# Patient Record
Sex: FEMALE | Race: WHITE | Employment: FULL TIME | URBAN - METROPOLITAN AREA
[De-identification: names, ages, dates, MRNs, and addresses within clinical notes are randomized per-mention and may not be internally consistent; named-entity substitution may affect disease eponyms.]

---

## 2017-01-04 ENCOUNTER — OFFICE VISIT (OUTPATIENT)
Dept: PERINATAL CARE | Facility: HOSPITAL | Age: 37
End: 2017-01-04
Attending: PEDIATRICS

## 2017-01-04 PROCEDURE — 93325 DOPPLER ECHO COLOR FLOW MAPG: CPT

## 2017-01-04 PROCEDURE — 76825 ECHO EXAM OF FETAL HEART: CPT

## 2017-01-04 PROCEDURE — 76827 ECHO EXAM OF FETAL HEART: CPT

## 2017-01-04 NOTE — PROGRESS NOTES
CARDIOLOGY CONSULTATION AND FETAL ECHOCARDIOGRAM :     Dear Dr. Anselmo Scheuermann :Todd Scott     Thank you for requesting a follow up fetal echocardiogram and follow up consultation for Latesha West regarding fetal cardiac arrhythmia.  I saw her in the fetal echoc formation was noted. RECOMMENDATIONS :      1. Routine Ob/MFM care  2. Weekly fetal heart rate checks to make sure that there is no sustained tachycardia or bradycardia until the cardiac arrhythmia resolve.   3. Avoid caffeine and caffeinated food and dr

## 2017-01-26 PROCEDURE — 87081 CULTURE SCREEN ONLY: CPT | Performed by: OBSTETRICS & GYNECOLOGY

## 2017-02-18 ENCOUNTER — NURSE ONLY (OUTPATIENT)
Dept: LACTATION | Facility: HOSPITAL | Age: 37
End: 2017-02-18
Attending: OBSTETRICS & GYNECOLOGY
Payer: COMMERCIAL

## 2017-02-18 PROCEDURE — 99213 OFFICE O/P EST LOW 20 MIN: CPT | Performed by: NURSE PRACTITIONER

## 2017-02-18 NOTE — PATIENT INSTRUCTIONS
Christiane Whitney and [place] ( 19)    Naked weight today 7 lbs 5.4 oz    Milk intake today 20 ml on L and 4 ml on R    Breastfeed Shruthi about 10 min each side and then pump right away while he gets supplemented. Save that milk for the next feeding.   We ounces per week    Supplementation:   If not meeting these guidelines for adequate breastfeeding, feed 1-2 oz or more expressed milk or formula with a wide based, slow flow nipple    Paced bottle feeding using a slow flow nipple:   · Hold your baby in an u

## 2017-02-18 NOTE — PROGRESS NOTES
LACTATION NOTE - MOTHER           Problems identified  Problems identified: Knowledge deficit; Nipple pain; Nipple trauma; Engorgement; Unable to acheive sustained latch;Milk supply not WNL  Milk supply not WNL: Reduced (potential)  Problems Identified Other: offered followup visit, will call if needed  Written Education: Engorgement/sore nipple prevention and management; Outpatient lactation clinic handout;Milk supply increase strategies;Breastfeeding suggestions for home;Patient Instructions; Supplementation Gu

## 2017-02-19 ENCOUNTER — TELEPHONE (OUTPATIENT)
Dept: OBGYN UNIT | Facility: HOSPITAL | Age: 37
End: 2017-02-19

## 2017-02-20 ENCOUNTER — TELEPHONE (OUTPATIENT)
Dept: OBGYN UNIT | Facility: HOSPITAL | Age: 37
End: 2017-02-20

## 2017-02-28 ENCOUNTER — NURSE ONLY (OUTPATIENT)
Dept: LACTATION | Facility: HOSPITAL | Age: 37
End: 2017-02-28
Attending: OBSTETRICS & GYNECOLOGY
Payer: COMMERCIAL

## 2017-02-28 PROCEDURE — 99213 OFFICE O/P EST LOW 20 MIN: CPT

## 2017-02-28 NOTE — PROGRESS NOTES
LACTATION NOTE - MOTHER           Problems identified  Problems identified: Knowledge deficit;Milk supply WNL  Problems Identified Other:  (hx of nipple pain/trauma, now healed with nipple shield use for last 9 days.)         Breastfeeding goal  Breastfeed

## 2017-03-24 PROCEDURE — 87660 TRICHOMONAS VAGIN DIR PROBE: CPT | Performed by: OBSTETRICS & GYNECOLOGY

## 2017-03-24 PROCEDURE — 87480 CANDIDA DNA DIR PROBE: CPT | Performed by: OBSTETRICS & GYNECOLOGY

## 2017-03-24 PROCEDURE — 87510 GARDNER VAG DNA DIR PROBE: CPT | Performed by: OBSTETRICS & GYNECOLOGY

## (undated) NOTE — MR AVS SNAPSHOT
After Visit Summary   2017    Phan Arshad    MRN: [id number]           Visit Information        Provider Department Dept Phone    2017 12:00 PM [id number]   Outpt [phone]      Your Vitals Were     LMP

## (undated) NOTE — MR AVS SNAPSHOT
[facility]  [address] [facility] [address]  [phone]               Thank you for choosing us for your health care visit with 1 Crispin Contreras.   We are glad to serve you and happy to provide you with this summary Summaries. If you've been to the Emergency Department or your doctor's office, you can view your past visit information in [employer] by going to Visits < Visit Summaries. [employer] questions? Call [phone] for help.   [employer] is NOT to be used for urge

## (undated) NOTE — MR AVS SNAPSHOT
[facility]  [address] [facility] [address]  [phone]               Thank you for choosing us for your health care visit with 1 Crispin Contreras.   We are glad to serve you and happy to provide you with this summary · Stroke nipple lightly down center of lips  · Wait for wide mouth with tongue cupped at bottom of mouth. · Chin should be deep into breast, with some room between nose and the breast.   · If needed, gently draw chin down lower to deepen latch.     Is baby · Watch for signs of breast infection (mastitis) - painful breast, reddened area, fever, chills or flu-like symptoms - call your OB doctor at once if this occurs. Follow-up:  · Call lactation [phone] in 1-2 days and as needed.    · Schedule follow- Make half your plate fruits and vegetables Highly refined, white starches including white bread, rice and pasta   Eat plenty of protein, keep the fat content low Sugars:  sodas and sports drinks, candies and desserts   Eat plenty of low-fat dairy products